# Patient Record
Sex: FEMALE | Race: WHITE | NOT HISPANIC OR LATINO | Employment: UNEMPLOYED | URBAN - METROPOLITAN AREA
[De-identification: names, ages, dates, MRNs, and addresses within clinical notes are randomized per-mention and may not be internally consistent; named-entity substitution may affect disease eponyms.]

---

## 2021-09-03 ENCOUNTER — TRANSCRIBE ORDERS (OUTPATIENT)
Dept: PHYSICAL THERAPY | Facility: CLINIC | Age: 36
End: 2021-09-03

## 2021-09-03 DIAGNOSIS — S46.819A STRAIN OF TRAPEZIUS MUSCLE, UNSPECIFIED LATERALITY, INITIAL ENCOUNTER: ICD-10-CM

## 2021-09-03 DIAGNOSIS — S46.811A TRAPEZIUS STRAIN, RIGHT, INITIAL ENCOUNTER: Primary | ICD-10-CM

## 2021-09-07 ENCOUNTER — TREATMENT (OUTPATIENT)
Dept: PHYSICAL THERAPY | Facility: CLINIC | Age: 36
End: 2021-09-07

## 2021-09-07 DIAGNOSIS — M54.50 THORACOLUMBAR BACK PAIN: ICD-10-CM

## 2021-09-07 DIAGNOSIS — S46.811D TRAPEZIUS STRAIN, RIGHT, SUBSEQUENT ENCOUNTER: Primary | ICD-10-CM

## 2021-09-07 DIAGNOSIS — M54.6 THORACOLUMBAR BACK PAIN: ICD-10-CM

## 2021-09-07 PROCEDURE — 97161 PT EVAL LOW COMPLEX 20 MIN: CPT | Performed by: PHYSICAL THERAPIST

## 2021-09-07 PROCEDURE — 97014 ELECTRIC STIMULATION THERAPY: CPT | Performed by: PHYSICAL THERAPIST

## 2021-09-07 PROCEDURE — 97140 MANUAL THERAPY 1/> REGIONS: CPT | Performed by: PHYSICAL THERAPIST

## 2021-09-07 NOTE — PROGRESS NOTES
"Physical Therapy Initial Evaluation and Plan of Care    Patient: Poornima Cano   : 1985  Diagnosis/ICD-10 Code:  Trapezius strain, right, subsequent encounter [S46.811D]  Referring practitioner: LAWRENCE Aguilar  Date of Initial Visit: 2021  Today's Date: 2021  Patient seen for 1 sessions           Subjective Questionnaire: Quick DASH   36/55      Subjective Evaluation    History of Present Illness  Mechanism of injury: Patient reports that she was at working, lifting a second 150# metal bar (with assist from another person) she felt a pop at approx waist level. Immediate pain into the right hip. Date of onset:   21.  She did continue to work her shift.  The pain radiated up the back.  When she went to work the next day, the pain was worse; to ; sent home that day.   Off work for the weekend, when she returned to work on Monday, the pain was about the same.  She was sent to [facility]; placed on naproxen (stopped due to stomach; thus stopped) and place on light duty for work.   Now driving a forklift.  The gear shifts do hurt.  The current pain is some better than onset day.   However, any day I do activity, \"I feel it is a set back\"  No change with cough/sneeze.  No associated Has, visual or auditory issues.   CHIEF CO:  All the above - pain, wakes her up at night, pain at bra level  LOCATION  R shldr pain is from mid neck to arm pit and mid back  DESCRIPTION  R UT pain is a toothache,  R anterior shldr and bicep area feels like it is on fire at times.  Random spasm in the shldr.   INCREASES  Picking up \"anything\" from a full 16 oz Yeti cup, driving due to the need to extend the arm hurts.  Tries to due more things with the left hand/arm due to \"protecting\" the arm.  Latching the bra.   DECREASES   Rest,  Ice   NO CHANGE WITH HEAT.  TIME OF DAY:  Work day the pain is worse by evening    SLEEP  Normal is RSB, normal is 6-8 hrs.  Now she has difficulty getting comfortable,  Trying LSB with " a pillow,  Waking up q 2-3 hrs.  Does not prefer supine.    PRIOR EX:  Normal is running in the warm months.   PAST MEDICAL HISTORY:  Afebrile seizures/last one was around 4 yrs of age.     (-) pacemaker, CA, DM metal implants,    (+) loratab,  Intolerance to bendryl (sleeps really long), phenobarbital        Patient Occupation: [employer]ing  2pm - 1030 p   Precautions and Work Restrictions: current is light dutyQuality of life: excellent    Pain  Current pain ratin  At best pain ratin  At worst pain rating: 10    Hand dominance: right             Objective          Postural Observations    Additional Postural Observation Details  AC to ear: L 17 cml   R 18 cm 5 FHP     Palpation     Right Tenderness of the levator scapulae and upper trapezius.   Trigger point to upper trapezius.     Tenderness     Right Shoulder  Tenderness in the subscapularis tendon and supraspinatus tendon.     Cervical/Thoracic Screen   Cervical range of motion within normal limits  Cervical range of motion within normal limits with the following exceptions: LSB with stretch feeling on the R    Active Range of Motion   Left Shoulder   External rotation BTH: T3   Internal rotation BTB: T7     Right Shoulder   Flexion: 140 degrees   Extension: 55 degrees   Abduction: 135 degrees   External rotation 0°: 75 degrees   External rotation BTH: T3   Internal rotation BTB: T9     Additional Active Range of Motion Details  Flex:   Pain from 95 to end range   abd pain from 10 to end range    Passive Range of Motion     Right Shoulder   Normal passive range of motion    Additional Passive Range of Motion Details  Slight pain with PROM R shldr @ 120 flex, abd    Scapular Mobility     Right Shoulder   Scapular mobility: WFL    Joint Play     Additional Joint Play Details  jt mobility shldr normal   Cervical PIVMs  1+ R upglides  Thoracic PA  2-/3    Strength/Myotome Testing     Additional Strength Details  : L 80#,  R 70#    Pinch:   L 15#,  12.5#    Tip pinch:   L 10#,   R 7#   R shldr flex, abd 3+ with increased pain.  ER 4-,  IR 4-, supraspinatus 3+ greater pain, subscap 4-, T minor 4-  Elbow, forearm wrist, thumb extension and lumbricals 5/5    Tests     Right Shoulder   Positive Neer's and painful arc.   Negative anterior load and shift, apprehension, belly press, drop arm, laxity (step off) and lift-off.           Assessment & Plan     Assessment  Impairments: abnormal muscle firing, abnormal or restricted ROM, activity intolerance, impaired physical strength, lacks appropriate home exercise program and pain with function  Assessment details: Poornima Cano is a 35 y.o. yr old female referred to PT due to  R shoulder/neck/UT pain due to an injury sustained at work when lifting a steel beam on 21.  She does report the R shldr pain is the most significant; however does have some mid back pain.    The initial PT evaluation was completed today with evidence consist of R UT strain with associated reduction of cervical passive motion, R Shldr painful arc/impingement and weakness. .  Skilled PT intervention is indicated in order to achieve the stated goals and achieve maximal functional capacity.  The Quick DASH score is 36/55    eval complexity:  Low  Prognosis: good  Functional Limitations: carrying objects, lifting, sleeping, pulling, pushing, uncomfortable because of pain, moving in bed, reaching behind back and reaching overhead  Goals  Plan Goals: Patient goals:   1)  ROM R shldr and 2) move the arm normally  ST visits     1)  Reduce pain level by 2-3 points     2)  Increase AROM R shldr to =/> 80% norm values wo greater pain     3)  Sleep with < 25% interruption due to shldr pain      4)  Pnt to report less arm pain in the 1st am by 30%    LTG:   DC     1)  0-2/10 pain level in order to complete household and work tasks wo greater pain     2)  MMT =/> 4+/5 with improved ability to lift objects for household, yard and daily living tasks wo  greater pain     3)  Improve the functional survey =/> 8 points     4)  Normal sleep pattern in relationship to the shldr injury     5)  Resume prior normal work duties     6)  IHEP     Plan  Therapy options: will be seen for skilled physical therapy services  Planned modality interventions: cryotherapy, dry needling, electrical stimulation/Russian stimulation, high voltage pulsed current (pain management), iontophoresis, TENS, thermotherapy (hydrocollator packs) and ultrasound  Planned therapy interventions: manual therapy, neuromuscular re-education, motor coordination training, postural training, therapeutic activities, stretching, strengthening, soft tissue mobilization, joint mobilization, home exercise program, functional ROM exercises, flexibility, body mechanics training, abdominal trunk stabilization and spinal/joint mobilization  Frequency: 3x week  Treatment plan discussed with: patient        Timed:         Manual Therapy:    10     mins  24538;     Therapeutic Exercise:    3     mins  95581;     Neuromuscular Ivan:        mins  86170;    Therapeutic Activity:          mins  06145;     Gait Training:           mins  15271;     Ultrasound:          mins  95282;    Ionto                                   mins   65789  Self Care                       3     mins   56999  Canalith Repos         mins 14857      Un-Timed:  Electrical Stimulation:    15     mins  34162 ( );  Dry Needling          mins self-pay  Traction          mins 75665  Low Eval     24     Mins  79609  Mod Eval          Mins  45512  High Eval                            Mins  41836  Re-Eval                               mins  62013        Timed Treatment:   16   mins   Total Treatment:     55   mins    PT SIGNATURE: Priya Olivares, ELLIOT   DATE TREATMENT INITIATED: 9/7/2021    Initial Certification  Certification Period: 12/6/2021  I certify that the therapy services are furnished while this patient is under my care.  The services  outlined above are required by this patient, and will be reviewed every 90 days.     PHYSICIAN: Pedro Flores PA      DATE:     Please sign and return via fax to [phone].. Thank you, [facility].

## 2021-09-09 ENCOUNTER — TREATMENT (OUTPATIENT)
Dept: PHYSICAL THERAPY | Facility: CLINIC | Age: 36
End: 2021-09-09

## 2021-09-09 ENCOUNTER — TELEPHONE (OUTPATIENT)
Dept: PHYSICAL THERAPY | Facility: CLINIC | Age: 36
End: 2021-09-09

## 2021-09-09 DIAGNOSIS — S46.811D TRAPEZIUS STRAIN, RIGHT, SUBSEQUENT ENCOUNTER: Primary | ICD-10-CM

## 2021-09-09 PROCEDURE — 97140 MANUAL THERAPY 1/> REGIONS: CPT | Performed by: PHYSICAL THERAPIST

## 2021-09-09 PROCEDURE — 97014 ELECTRIC STIMULATION THERAPY: CPT | Performed by: PHYSICAL THERAPIST

## 2021-09-09 PROCEDURE — 97035 APP MDLTY 1+ULTRASOUND EA 15: CPT | Performed by: PHYSICAL THERAPIST

## 2021-09-09 NOTE — PROGRESS NOTES
Physical Therapy Daily Progress Note    VISIT#: 2    Subjective   Poornima Cano reports she had to trim the Ktape as some of it was coming off. Pt with 8 pain today.     Objective     Palpation: Trigger point noted R UT/LS     See Exercise, Manual, and Modality Logs for complete treatment.     Patient Education: cues for therex    Assessment/Plan  Pt extremely tender with attempts at manual therapy so added US followed by remainder of     Progress per Plan of Care and Progress strengthening /stabilization /functional activity            Timed:         Manual Therapy:   18      mins  51840;     Therapeutic Exercise:    2     mins  70801;     Neuromuscular Ivan:        mins  12215;    Therapeutic Activity:          mins  60762;     Gait Training:           mins  28764;     Ultrasound:   12      mins  59437;    Ionto                                   mins   65035  Self Care                            mins   25614  Canalith Repos                   mins  34699    Un-Timed:  Electrical Stimulation:   15      mins  19560 ( );  Dry Needling          mins self-pay  Traction          mins 98782  Low Eval          Mins  33921  Mod Eval          Mins  30882  High Eval                           Mins  33885  Re-Eval                               mins  96266    Timed Treatment:  32    mins   Total Treatment:     47   mins    Angela Pruitt, PT  [place] License # [id number]  Physical Therapist

## 2021-09-13 ENCOUNTER — TREATMENT (OUTPATIENT)
Dept: PHYSICAL THERAPY | Facility: CLINIC | Age: 36
End: 2021-09-13

## 2021-09-13 DIAGNOSIS — M54.6 THORACOLUMBAR BACK PAIN: ICD-10-CM

## 2021-09-13 DIAGNOSIS — M54.50 THORACOLUMBAR BACK PAIN: ICD-10-CM

## 2021-09-13 DIAGNOSIS — S46.811D TRAPEZIUS STRAIN, RIGHT, SUBSEQUENT ENCOUNTER: Primary | ICD-10-CM

## 2021-09-13 PROCEDURE — 97014 ELECTRIC STIMULATION THERAPY: CPT | Performed by: PHYSICAL THERAPIST

## 2021-09-13 PROCEDURE — 97110 THERAPEUTIC EXERCISES: CPT | Performed by: PHYSICAL THERAPIST

## 2021-09-13 PROCEDURE — 97140 MANUAL THERAPY 1/> REGIONS: CPT | Performed by: PHYSICAL THERAPIST

## 2021-09-13 NOTE — PROGRESS NOTES
Physical Therapy Daily Progress Note    VISIT#: 3    Subjective   Poornima Cano reports that she feels that ultrasound didn't do anything and that her skin is having an adverse reaction to the KT tape, but ovreall her pain is decreasing  Pain Rating (0/10): 0    Objective     See Exercise, Manual, and Modality Logs for complete treatment.       Assessment/Plan   Ultrasound and KT taping held at this time due to pt reporting no improvements and adverse reaction with slight skin irritation noted where KT tape was previously applied. STM to UT and LS performed today with improvements in hypertonicity noted following.     Plan  Progress per Plan of Care and Progress strengthening /stabilization /functional activity            Timed:         Manual Therapy:    18     mins  47500;     Therapeutic Exercise:    13     mins  07766;     Neuromuscular Ivan:        mins  70968;    Therapeutic Activity:          mins  83555;     Gait Training:           mins  34149;     Ultrasound:          mins  16432;    Ionto                                   mins   89343  Self Care                            mins   27468    Un-Timed:  Electrical Stimulation:    15     mins  06831 ( );  Dry Needling          mins self-pay  Traction          mins 06643  Low Eval          Mins  61185  Mod Eval          Mins  55611  High Eval                            Mins  64165  Re-Eval                               mins  27908    Timed Treatment:   31   mins   Total Treatment:     46   mins    Hina Infante PT, DPT  Physical Therapist

## 2021-09-15 ENCOUNTER — TREATMENT (OUTPATIENT)
Dept: PHYSICAL THERAPY | Facility: CLINIC | Age: 36
End: 2021-09-15

## 2021-09-15 DIAGNOSIS — M54.50 THORACOLUMBAR BACK PAIN: ICD-10-CM

## 2021-09-15 DIAGNOSIS — S46.811D TRAPEZIUS STRAIN, RIGHT, SUBSEQUENT ENCOUNTER: Primary | ICD-10-CM

## 2021-09-15 DIAGNOSIS — M54.6 THORACOLUMBAR BACK PAIN: ICD-10-CM

## 2021-09-15 PROCEDURE — 97140 MANUAL THERAPY 1/> REGIONS: CPT | Performed by: PHYSICAL THERAPIST

## 2021-09-15 PROCEDURE — 97014 ELECTRIC STIMULATION THERAPY: CPT | Performed by: PHYSICAL THERAPIST

## 2021-09-15 PROCEDURE — 97110 THERAPEUTIC EXERCISES: CPT | Performed by: PHYSICAL THERAPIST

## 2021-09-15 NOTE — PROGRESS NOTES
Physical Therapy Daily Progress Note    VISIT#: 4    Subjective   Poornima Cano reports that the pain is less; but with more soreness.  Feels the levator stretch is the most beneficial of all the stretches.       Objective     See Exercise, Manual, and Modality Logs for complete treatment.     Patient Education:    Assessment/Plan  Poornima was able to complete shldr band and bar shlsr ex with some fatigue and very mild posterior R shldr pain during extension.       Plan;  Cont with the current treatment plan.             Timed:         Manual Therapy:    15     mins  26253;     Therapeutic Exercise:    12     mins  66130;     Neuromuscular Ivan:        mins  85128;    Therapeutic Activity:          mins  15002;     Gait Training:           mins  01908;     Ultrasound:          mins  25644;    Ionto                                   mins   54671  Self Care                            mins   26477  Canalith Repos                   mins  36327    Un-Timed:  Electrical Stimulation:    15     mins  71828 ( );  Dry Needling          mins self-pay  Traction          mins 36872  Low Eval          Mins  25723  Mod Eval          Mins  62812  High Eval                            Mins  33505  Re-Eval                               mins  16120    Timed Treatment:   27   mins   Total Treatment:     42   mins    Priya Olivares PT    Physical Therapist

## 2021-09-17 ENCOUNTER — TREATMENT (OUTPATIENT)
Dept: PHYSICAL THERAPY | Facility: CLINIC | Age: 36
End: 2021-09-17

## 2021-09-17 DIAGNOSIS — M54.50 THORACOLUMBAR BACK PAIN: ICD-10-CM

## 2021-09-17 DIAGNOSIS — S46.811D TRAPEZIUS STRAIN, RIGHT, SUBSEQUENT ENCOUNTER: Primary | ICD-10-CM

## 2021-09-17 DIAGNOSIS — M54.6 THORACOLUMBAR BACK PAIN: ICD-10-CM

## 2021-09-17 PROCEDURE — 97110 THERAPEUTIC EXERCISES: CPT | Performed by: PHYSICAL THERAPIST

## 2021-09-17 PROCEDURE — 97014 ELECTRIC STIMULATION THERAPY: CPT | Performed by: PHYSICAL THERAPIST

## 2021-09-17 PROCEDURE — 97140 MANUAL THERAPY 1/> REGIONS: CPT | Performed by: PHYSICAL THERAPIST

## 2021-09-17 PROCEDURE — 97530 THERAPEUTIC ACTIVITIES: CPT | Performed by: PHYSICAL THERAPIST

## 2021-09-17 NOTE — PROGRESS NOTES
Physical Therapy Daily Progress Note    VISIT#: 5    Subjective   Poornima Cano reports that she did feel more of the upper back sensation after the last session.  States the pain started about 6-8 hrs after the session and lasted the rest of the day.        Objective   Palpation with moderate R levator and UT thickening.  Normal Rhomboid   See Exercise, Manual, and Modality Logs for complete treatment.     Patient Education: discussion of importance of resuming strengthening in a slow controlled fashion.     Assessment/Plan  Patient did report greater soreness in the R mid back approx 6 -8 hrs post the last session.  However, the only ex added were shldr band saws, ext and chest press.  These ex would not cause such report and at such time lapse.  She did complete additional prone strengthening today wo any change of pain reports.     Plan:  Cont with PT as tolerance.          Timed:         Manual Therapy:    15     mins  48157;     Therapeutic Exercise:    12     mins  58124;     Neuromuscular Ivan:        mins  04506;    Therapeutic Activity:       10   mins  65245;     Gait Training:           mins  37155;     Ultrasound:          mins  21744;    Ionto                                   mins   93554  Self Care                       3     mins   21375  Canalith Repos                   mins  25166    Un-Timed:  Electrical Stimulation:         mins  65303 ( );  Dry Needling          mins self-pay  Traction          mins 52365  Low Eval          Mins  15046  Mod Eval          Mins  57114  High Eval                            Mins  31744  Re-Eval                               mins  27637    Timed Treatment:   40   mins   Total Treatment:     44   mins    Priya Olivares PT    Physical Therapist

## 2021-09-20 ENCOUNTER — TREATMENT (OUTPATIENT)
Dept: PHYSICAL THERAPY | Facility: CLINIC | Age: 36
End: 2021-09-20

## 2021-09-20 DIAGNOSIS — M54.6 THORACOLUMBAR BACK PAIN: ICD-10-CM

## 2021-09-20 DIAGNOSIS — S46.811D TRAPEZIUS STRAIN, RIGHT, SUBSEQUENT ENCOUNTER: Primary | ICD-10-CM

## 2021-09-20 DIAGNOSIS — M54.50 THORACOLUMBAR BACK PAIN: ICD-10-CM

## 2021-09-20 PROCEDURE — 97140 MANUAL THERAPY 1/> REGIONS: CPT | Performed by: PHYSICAL THERAPIST

## 2021-09-20 PROCEDURE — 97110 THERAPEUTIC EXERCISES: CPT | Performed by: PHYSICAL THERAPIST

## 2021-09-20 NOTE — PROGRESS NOTES
Re-Assessment / Progress Note    Patient: Poornima Cano   : 1985  Diagnosis/ICD-10 Code:  Trapezius strain, right, subsequent encounter [S46.811D]  Referring practitioner: No ref. provider found  Date of Initial Visit: Episode Type: THERAPY  Noted: 2021    Today's Date: 2021  Patient seen for 6 sessions.      Subjective:   Poornima Cano reports: that her pain is down overall and she is continuing to get numbness and tingling intermittently. She also reports that stabbing pain is infrequent and lasts short duration of time. Pain current:  3/10  Best: 3/10   Worst: 7/10  Subjective Questionnaire: QuickDASH: 31% disability  Clinical Progress: improved  Home Program Compliance: Yes  Treatment has included: therapeutic exercise, neuromuscular re-education, manual therapy, therapeutic activity, electrical stimulation, moist heat and cryotherapy    Subjective   Objective   Assessment/Plan     Objective          Palpation     Right Tenderness of the levator scapulae and upper trapezius.   Trigger point to upper trapezius.     Tenderness     Right Shoulder  Tenderness in the subscapularis tendon and supraspinatus tendon.       Active Range of Motion   Left Shoulder   External rotation BTH: T3   Internal rotation BTB: T7     Right Shoulder   Flexion: 130 degrees   Extension: 55 degrees   Abduction: 113 degrees   External rotation 0°: 83 degrees   External rotation BTH: T3   Internal rotation BTB: T9       Scapular Mobility     Right Shoulder   Scapular mobility: WFL      Strength/Myotome Testing     Additional Strength Details  : L 68#,  R 46#    R shldr flex, abd 4 with increased pain.  ER 4+,  IR 4+, supraspinatus 4 greater pain, subscap 4, T minor 4  Elbow, forearm wrist, thumb extension and lumbricals 5/5    Tests     Right Shoulder   Positive Neer's and painful arc.   Negative anterior load and shift, apprehension, belly press, drop arm, laxity (step off) and lift-off.           Assessment & Plan      Assessment  Impairments: abnormal muscle firing, abnormal or restricted ROM, activity intolerance, impaired physical strength, lacks appropriate home exercise program and pain with function  Assessment details: Poornima Cano is a 35 y.o. yr old female referred to PT due to  R shoulder/neck/UT pain due to an injury sustained at work when lifting a steel beam on 21.  She does report the R shldr pain is the most significant; however does have some mid back pain.    The initial PT evaluation was completed with evidence consist of R UT strain with associated reduction of cervical passive motion, R Shldr painful arc/impingement and weakness.Pt has demonstrated improvements in strength, activity tolerance, decreased pain and improved quick DASH score. Pt would benefit from continued skilled PT to address the remaining impairments.  The Quick DASH score is 31/55    eval complexity:  Low  Prognosis: good  Functional Limitations: carrying objects, lifting, sleeping, pulling, pushing, uncomfortable because of pain, moving in bed, reaching behind back and reaching overhead  Goals  Plan Goals: Patient goals:   1)  ROM R shldr and 2) move the arm normally  ST visits     1)  Reduce pain level by 2-3 points- MET     2)  Increase AROM R shldr to =/> 80% norm values wo greater pain- Progressing     3)  Sleep with < 25% interruption due to shldr pain - MET     4)  Pnt to report less arm pain in the 1st am by 30%- MET    LTG:   DC     1)  0-2/10 pain level in order to complete household and work tasks wo greater pain- progressing     2)  MMT =/> 4+/5 with improved ability to lift objects for household, yard and daily living tasks wo greater pain-progressing     3)  Improve the functional survey =/> 8 points- progressing     4)  Normal sleep pattern in relationship to the shldr injury- MET     5)  Resume prior normal work duties- progressing     6)  IHEP -progressing      Progress toward previous goals: Partially  Met  Recommendations: Continue as planned  Timeframe: 1 month  Prognosis to achieve goals: good    PT Signature: Hina Infante PT, DPT  [place] Lic. # [id number]        Based upon review of the patient's progress and continued therapy plan, it is my medical opinion that Poornima Cano should continue physical therapy treatment at [facility]  [address]  [place] [address] [phone].    Signature: __________________________________      Timed:         Manual Therapy:    15     mins  03783;     Therapeutic Exercise:    15     mins  14860;     Neuromuscular Ivan:        mins  72636;    Therapeutic Activity:          mins  01681;     Gait Training:           mins  21991;     Ultrasound:          mins  03775;    Ionto                                   mins   03935  Self Care                            mins   08719        Un-Timed:  Electrical Stimulation:         mins  33824 ( );  Dry Needling          mins self-pay  Traction          mins 91961  Low Eval          Mins  78058  Mod Eval          Mins  02738  High Eval                           Mins  27297  Re-Eval                               mins  66293        Timed Treatment:   30   mins   Total Treatment:     30   mins

## 2021-09-27 ENCOUNTER — TREATMENT (OUTPATIENT)
Dept: PHYSICAL THERAPY | Facility: CLINIC | Age: 36
End: 2021-09-27

## 2021-09-27 DIAGNOSIS — M54.50 THORACOLUMBAR BACK PAIN: ICD-10-CM

## 2021-09-27 DIAGNOSIS — S46.811D TRAPEZIUS STRAIN, RIGHT, SUBSEQUENT ENCOUNTER: Primary | ICD-10-CM

## 2021-09-27 DIAGNOSIS — M54.6 THORACOLUMBAR BACK PAIN: ICD-10-CM

## 2021-09-27 PROCEDURE — 97140 MANUAL THERAPY 1/> REGIONS: CPT | Performed by: PHYSICAL THERAPIST

## 2021-09-27 PROCEDURE — 97110 THERAPEUTIC EXERCISES: CPT | Performed by: PHYSICAL THERAPIST

## 2021-09-27 NOTE — PROGRESS NOTES
Physical Therapy Daily Progress Note    VISIT#: 7    Subjective   Poornima Cano reports that she had noted increase in pain following prolonged period of time between sessions with greatest pain noted yesterday.   Pain Rating (0/10): 6    Objective     See Exercise, Manual, and Modality Logs for complete treatment.     Assessment/Plan   Hypertonicity noted in R UT today with minimal relaxation noted following manual therapy with MHP applied at the end of the session for muscle relaxation and slight improvement noted    Plan  Progress per Plan of Care and Progress strengthening /stabilization /functional activity            Timed:         Manual Therapy:    15     mins  52748;     Therapeutic Exercise:    12     mins  90760;     Neuromuscular Ivan:        mins  17439;    Therapeutic Activity:          mins  70236;     Gait Training:           mins  72832;     Ultrasound:          mins  37899;    Ionto                                   mins   25191  Self Care                            mins   90788    Un-Timed:  Electrical Stimulation:         mins  29429 ( );  Dry Needling          mins self-pay  Traction          mins 00530  Low Eval          Mins  45893  Mod Eval          Mins  09757  High Eval                            Mins  63463  Re-Eval                               mins  64685    Timed Treatment:   27   mins   Total Treatment:     27   mins    Hina Infante PT, DPT  Physical Therapist

## 2021-09-29 ENCOUNTER — TREATMENT (OUTPATIENT)
Dept: PHYSICAL THERAPY | Facility: CLINIC | Age: 36
End: 2021-09-29

## 2021-09-29 DIAGNOSIS — S46.811D TRAPEZIUS STRAIN, RIGHT, SUBSEQUENT ENCOUNTER: Primary | ICD-10-CM

## 2021-09-29 DIAGNOSIS — M54.6 THORACOLUMBAR BACK PAIN: ICD-10-CM

## 2021-09-29 DIAGNOSIS — M54.50 THORACOLUMBAR BACK PAIN: ICD-10-CM

## 2021-09-29 PROCEDURE — 97140 MANUAL THERAPY 1/> REGIONS: CPT | Performed by: PHYSICAL THERAPIST

## 2021-09-29 PROCEDURE — 97110 THERAPEUTIC EXERCISES: CPT | Performed by: PHYSICAL THERAPIST

## 2021-09-29 NOTE — PROGRESS NOTES
Physical Therapy Daily Progress Note    VISIT#: 8    Subjective   Poornima Cano reports that she was woken up again last night due to pain and that she is continuing to have burning going down the R side of her spine in mid thoracic   Pain Rating (0/10): 8 at the beginning of the session 4 following treatment    Objective     See Exercise, Manual, and Modality Logs for complete treatment.       Assessment/Plan   MHP was applied pre tx for increased muscle extensibility, with greater tolerance and decreased hypertonicity of R UT and scapular musculature. IASTM performed today with noted improvements in tone of RUT and in decreased burning/numbness and tingling of RUE    Plan  Progress per Plan of Care and Progress strengthening /stabilization /functional activity            Timed:         Manual Therapy:    20     mins  91474;     Therapeutic Exercise:    10     mins  06055;     Neuromuscular Ivan:       mins  84866;    Therapeutic Activity:          mins  82908;     Gait Training:           mins  19528;     Ultrasound:          mins  91814;    Ionto                                   mins   06448  Self Care                            mins   69366    Un-Timed:  Electrical Stimulation:        mins  69241 ( );  Dry Needling          mins self-pay  Traction          mins 61739  Low Eval          Mins  52560  Mod Eval          Mins  88149  High Eval                            Mins  07178  Re-Eval                               mins  63016    Timed Treatment:   30   mins   Total Treatment:     30   mins    Hina Infante PT, DPT  Physical Therapist

## 2021-10-01 ENCOUNTER — TREATMENT (OUTPATIENT)
Dept: PHYSICAL THERAPY | Facility: CLINIC | Age: 36
End: 2021-10-01

## 2021-10-01 DIAGNOSIS — M54.6 THORACOLUMBAR BACK PAIN: ICD-10-CM

## 2021-10-01 DIAGNOSIS — S46.811D TRAPEZIUS STRAIN, RIGHT, SUBSEQUENT ENCOUNTER: Primary | ICD-10-CM

## 2021-10-01 DIAGNOSIS — M54.50 THORACOLUMBAR BACK PAIN: ICD-10-CM

## 2021-10-01 PROCEDURE — 97014 ELECTRIC STIMULATION THERAPY: CPT | Performed by: PHYSICAL THERAPIST

## 2021-10-01 PROCEDURE — 97140 MANUAL THERAPY 1/> REGIONS: CPT | Performed by: PHYSICAL THERAPIST

## 2021-10-01 PROCEDURE — 97110 THERAPEUTIC EXERCISES: CPT | Performed by: PHYSICAL THERAPIST

## 2021-10-01 NOTE — PROGRESS NOTES
Physical Therapy Daily Progress Note    VISIT#: 9    Subjective   Poornima Cano reports the pain is currently 2-3/10    Did get some relief and ability to sleep       Objective   Treatment session started with MHP/IFC.  Palpation with mild thickening R rhomboid and RUT with restricted cervical PIVMs.   See Exercise, Manual, and Modality Logs for complete treatment.     Patient Education:    Assessment/Plan  Poornima presented today with less pain than the last session.  Able to complete the ex wo greater pain.     Plan:  If pain level remains low, less modalities and increase ex/functional task as pnt is on altered dutes.            Timed:         Manual Therapy:    16     mins  68851;     Therapeutic Exercise:    13     mins  91055;     Neuromuscular Ivan:        mins  09098;    Therapeutic Activity:          mins  14899;     Gait Training:           mins  43049;     Ultrasound:          mins  07125;    Ionto                                   mins   70606  Self Care                            mins   36897  Canalith Repos                   mins  16355    Un-Timed:  Electrical Stimulation:    15     mins  40226 ( );  Dry Needling          mins self-pay  Traction          mins 10429  Low Eval          Mins  15877  Mod Eval          Mins  87980  High Eval                            Mins  38231  Re-Eval                               mins  56868    Timed Treatment:   29   mins   Total Treatment:     44   mins    Priya Olivares PT    Physical Therapist

## 2021-10-04 ENCOUNTER — TREATMENT (OUTPATIENT)
Dept: PHYSICAL THERAPY | Facility: CLINIC | Age: 36
End: 2021-10-04

## 2021-10-04 DIAGNOSIS — M54.50 THORACOLUMBAR BACK PAIN: ICD-10-CM

## 2021-10-04 DIAGNOSIS — S46.811D TRAPEZIUS STRAIN, RIGHT, SUBSEQUENT ENCOUNTER: Primary | ICD-10-CM

## 2021-10-04 DIAGNOSIS — M54.6 THORACOLUMBAR BACK PAIN: ICD-10-CM

## 2021-10-04 PROCEDURE — 97014 ELECTRIC STIMULATION THERAPY: CPT | Performed by: PHYSICAL THERAPIST

## 2021-10-04 PROCEDURE — 97140 MANUAL THERAPY 1/> REGIONS: CPT | Performed by: PHYSICAL THERAPIST

## 2021-10-04 PROCEDURE — 97110 THERAPEUTIC EXERCISES: CPT | Performed by: PHYSICAL THERAPIST

## 2021-10-04 NOTE — PROGRESS NOTES
Physical Therapy Daily Progress Note    Patient: Poornima Cano   : 1985  Diagnosis/ICD-10 Code:  Trapezius strain, right, subsequent encounter [S46.811D]  Referring practitioner: LAWRENCE Aguilar  Date of Initial Visit: Type: THERAPY  Noted: 2021  Today's Date: 10/4/2021  Patient seen for 10 sessions           Subjective Evaluation    History of Present Illness    Subjective comment: strain R UT continues. consents to thoracic adjustment today.        Objective   See Exercise, Manual, and Modality Logs for complete treatment.       Assessment & Plan     Assessment  Assessment details: Did well today with manip to mid thoracic spine.   Added UBE with no increase in pain. Added DNF ex for weak ant cervicals.  P: cont with combo of manual, modalities, ex rehab                 Timed:    Manual Therapy:    15     mins  83375;  Therapeutic Exercise:    15     mins  73252;     Neuromuscular Ivan:        mins  48852;    Therapeutic Activity:          mins  09835;     Gait Training:           mins  08084;     Ultrasound:          mins  30217;    Electrical Stimulation:         mins  49315 ( );  Iontophoresis         mins 09659;  Aquatic Therapy         mins 76208;  Dry Needling                   mins    Untimed:  Electrical Stimulation:    15     mins  90810 (MC );  Mechanical Traction:         mins  68429;     Timed Treatment:   30   mins   Total Treatment:     55   mins  Zorre Zeno Kimura, PT  Physical Therapist

## 2021-10-06 ENCOUNTER — TREATMENT (OUTPATIENT)
Dept: PHYSICAL THERAPY | Facility: CLINIC | Age: 36
End: 2021-10-06

## 2021-10-06 DIAGNOSIS — M54.6 THORACOLUMBAR BACK PAIN: ICD-10-CM

## 2021-10-06 DIAGNOSIS — M54.50 THORACOLUMBAR BACK PAIN: ICD-10-CM

## 2021-10-06 DIAGNOSIS — S46.811D TRAPEZIUS STRAIN, RIGHT, SUBSEQUENT ENCOUNTER: Primary | ICD-10-CM

## 2021-10-06 PROCEDURE — 97110 THERAPEUTIC EXERCISES: CPT | Performed by: PHYSICAL THERAPIST

## 2021-10-06 NOTE — PROGRESS NOTES
"Physical Therapy Daily Progress Note/30 day re-assess    Patient: Poornima Cano   : 1985  Diagnosis/ICD-10 Code:  Trapezius strain, right, subsequent encounter [S46.811D]  Referring practitioner: LAWRENCE Aguilar  Date of Initial Visit: Type: THERAPY  Noted: 2021  Today's Date: 10/6/2021  Patient seen for 11 sessions           Subjective Evaluation    History of Present Illness    Subjective comment: did well after last rx and manip. RTO OM this Friday after PT. 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 sleeping better.  is okay but twisting motion to open a tight jar was very difficult last night       Objective   See Exercise, Manual, and Modality Logs for complete treatment.       Assessment & Plan     Assessment  Assessment details: Goals  Plan Goals: Patient goals:   1)  ROM R shldr and 2) move the arm normally  ST visits     1)  Reduce pain level by 2-3 points. MET was 5-10 now 3/10     2)  Increase AROM R shldr to =/> 80% norm values wo greater pain. MET. 95% with small arc of pain but can work thru. C/o fatigue.      3)  Sleep with < 25% interruption due to shldr pain. MET. Able to sleep thru the night now.      4)  Pnt to report less arm pain in the 1st am by 30%. MET.    LTG:   DC     1)  0-2/10 pain level in order to complete household and work tasks wo greater pain. Partially met     2)  MMT =/> 4+/5 with improved ability to lift objects for household, yard and daily living tasks wo greater pain.Partially met. 4- lenin weak UT and posterior sh/mid trap     3)  Improve the functional survey =/> 8 points. NOT MET. 31 to just 29.5%     4)  Normal sleep pattern in relationship to the shldr injury. MET     5)  Resume prior normal work duties. Ongoing. RTO 10-8-21 to discuss     6)  IHEP. Ongoing as she progresses to more vigorous activity    Pt see for her 11th session today. Feels stronger and sleeping better but " pain and weakness of neck and posterior shoulder continues. Objective testing shows limited neck SB left and 4- strength of R scap and shoulder muscles vs. 5/5 on the L so symmetry and confidence are not equal yet. Excellent gains in R UE AROM vs. Eval.     New numbness with OH motion of R UE so will check first rib status next visit.   A: resolving upper trap strain.   P: OM Friday. Will most likely continue to need lifting restrictions.                Timed:    Manual Therapy:         mins  73322;  Therapeutic Exercise:    45     mins  83413;     Neuromuscular Ivan:        mins  94291;    Therapeutic Activity:          mins  23919;     Gait Training:           mins  81295;     Ultrasound:          mins  28387;    Electrical Stimulation:         mins  39384 ( );  Iontophoresis         mins 05610;  Aquatic Therapy         mins 58261;  Dry Needling                   mins    Untimed:  Electrical Stimulation:         mins  02381 ( );  Mechanical Traction:         mins  44087;     Timed Treatment:   45   mins   Total Treatment:     45   mins  Zorre Zeno Kimura, PT  Physical Therapist

## 2021-10-08 ENCOUNTER — TREATMENT (OUTPATIENT)
Dept: PHYSICAL THERAPY | Facility: CLINIC | Age: 36
End: 2021-10-08

## 2021-10-08 DIAGNOSIS — S46.811D TRAPEZIUS STRAIN, RIGHT, SUBSEQUENT ENCOUNTER: Primary | ICD-10-CM

## 2021-10-08 DIAGNOSIS — M54.6 THORACOLUMBAR BACK PAIN: ICD-10-CM

## 2021-10-08 DIAGNOSIS — M54.50 THORACOLUMBAR BACK PAIN: ICD-10-CM

## 2021-10-08 PROCEDURE — 97014 ELECTRIC STIMULATION THERAPY: CPT | Performed by: PHYSICAL THERAPIST

## 2021-10-08 PROCEDURE — 97110 THERAPEUTIC EXERCISES: CPT | Performed by: PHYSICAL THERAPIST

## 2021-10-08 PROCEDURE — 97140 MANUAL THERAPY 1/> REGIONS: CPT | Performed by: PHYSICAL THERAPIST

## 2021-10-08 NOTE — PROGRESS NOTES
"Physical Therapy Daily Progress Note    VISIT#: 12    Subjective   Poornima Cano reports worse pain level is 2/10.  Still with random in the R upper mid back.   The current work restrictions are 10#; she does still need to adjust the lift of an 8# lift at work due to the shldr pain.  \"weakness is still there\"      Objective   R shldr ROM:   WFL with pain at end range of flex and abd ( 160),  ER and IR @ 0 abd WNLs and pain free   Palpation with mild+ thickening of rhomboid R.    See Exercise, Manual, and Modality Logs for complete treatment.     Patient Education:    Assessment/Plan   Poornima able to complete more strengthening ex today.  She does continued with soreness and pain in the soft tissue.  Most deficits include weakness as compared to her normal job duties. She would benefit from continued PT in order to further improve her strength and reduce the injury risk when she returns to full duty.    ST visits     1)  Reduce pain level by 2-3 points. MET was 5-10 now 3/10 10/6/21     2)  Increase AROM R shldr to =/> 80% norm values wo greater pain. MET. 95% with small arc of pain but can work thru. 10/6/21     3)  Sleep with < 25% interruption due to shldr pain. MET. Able to sleep thru the night now. 10/6/21     4)  Pnt to report less arm pain in the 1st am by 30%. MET. 10/6/21    LTG:   DC (data as of 10/6/21)     1)  0-2/10 pain level in order to complete household and work tasks wo greater pain. Partially met     2)  MMT =/> 4+/5 with improved ability to lift objects for household, yard and daily living tasks wo greater pain.Partially met. 4- lenin weak UT and posterior sh/mid trap     3)  Improve the functional survey =/> 8 points. NOT MET. 31 to just 29.5%     4)  Normal sleep pattern in relationship to the shldr injury. MET     5)  Resume prior normal work duties. Ongoing. RTO 10-8-21 to discuss     6)  IHEP. Ongoing as she progresses to more vigorous activity    Pt see for her 11th session today. Feels " stronger and sleeping better but pain and weakness of neck and posterior shoulder continues. Objective testing shows limited neck SB left and 4- strength of R scap and shoulder muscles vs. 5/5 on the L so symmetry and confidence are not equal yet. Excellent gains in R UE AROM vs. Eval.     Plan:  Cont with current treatment plan as per [facility];  Focus more on strengthening strengthening.            Timed:         Manual Therapy:    12     mins  50860;     Therapeutic Exercise:    18     mins  46127;     Neuromuscular Ivan:        mins  87928;    Therapeutic Activity:          mins  96208;     Gait Training:           mins  19288;     Ultrasound:          mins  81663;    Ionto                                   mins   19521  Self Care                            mins   10415  Canalith Repos                   mins  45216    Un-Timed:  Electrical Stimulation:    15     mins  50825 ( );  Dry Needling          mins self-pay  Traction          mins 53771  Low Eval          Mins  13637  Mod Eval          Mins  78374  High Eval                            Mins  79128  Re-Eval                               mins  13710    Timed Treatment:   45   mins   Total Treatment:     47   mins    Priya Olivares PT    Physical Therapist

## 2021-10-15 ENCOUNTER — TREATMENT (OUTPATIENT)
Dept: PHYSICAL THERAPY | Facility: CLINIC | Age: 36
End: 2021-10-15

## 2021-10-15 DIAGNOSIS — M54.6 THORACOLUMBAR BACK PAIN: ICD-10-CM

## 2021-10-15 DIAGNOSIS — S46.811D TRAPEZIUS STRAIN, RIGHT, SUBSEQUENT ENCOUNTER: Primary | ICD-10-CM

## 2021-10-15 DIAGNOSIS — M54.50 THORACOLUMBAR BACK PAIN: ICD-10-CM

## 2021-10-15 PROCEDURE — 97014 ELECTRIC STIMULATION THERAPY: CPT | Performed by: PHYSICAL THERAPIST

## 2021-10-15 PROCEDURE — 97530 THERAPEUTIC ACTIVITIES: CPT | Performed by: PHYSICAL THERAPIST

## 2021-10-15 PROCEDURE — 97140 MANUAL THERAPY 1/> REGIONS: CPT | Performed by: PHYSICAL THERAPIST

## 2021-10-15 PROCEDURE — 97110 THERAPEUTIC EXERCISES: CPT | Performed by: PHYSICAL THERAPIST

## 2021-10-15 NOTE — PROGRESS NOTES
Physical Therapy Daily Progress Note    VISIT#: 13    Subjective   Poornima Cano reports that she worked an 8 - hr overtime day on Sat.  States the current pain is 5/10 with location along the R UT, mid back and ariadne R shldr.   Restricted ob duties remained the same. Exception of no one available to assist during the OT day.  During our discussion, she did report the pain level, if constant, she would go to the ED. She states her uncle is a chiropractor, and she has had prior ongoing care of such, and she is wanting to go to see him.        Objective   R shldr motion:  Flex and abd with anterior and lateral shldr pain.  Shldr ext WNLs ,pain anterior shldr at end range.  BTB IR T9.   Lift off with difficulty.  Cervical ROM:  Flex with rosi cervical spine stretch,   LSB and L rotation with pain on R cervicalRUT.  Palpation:  Mild thickening of R UT.  No specific thickening at R mid trap or rhomboid; tenderness noted per patient.  No scapular winging noted.  Very mild L RS of C5C6.  Passive Motion:  shlder and  Cervical spine WNLS.     See Exercise, Manual, and Modality Logs for complete treatment.   IFC and MHP at the beginning of the session due to pnt's pain level.     Patient Education: discussion with patient regarding current presentation today.  Encouraged patient to review coverage if she does seek other professional care.    Assessment/Plan   Poornima reports more pain level today post an overtime day on Sat.  No change of the job duties.  Clinical presentation essentially unchanged from the last session regarding soft tissue, mobility and passive motion. There is noted weakness of subscap. No clinical correlation to the greater pain as described today with the exception of the subscap.   Patient does voice the desire to seek chiropractor care.     ST visits     1)  Reduce pain level by 2-3 points. MET was 5-10 now 3/10 10/6/21     2)  Increase AROM R shldr to =/> 80% norm values wo greater pain. MET. 95%  with small arc of pain but can work thru. 10/6/21     3)  Sleep with < 25% interruption due to shldr pain. MET. Able to sleep thru the night now. 10/6/21     4)  Pnt to report less arm pain in the 1st am by 30%. MET. 10/6/21    LTG:   DC (data as of 10/6/21)     1)  0-2/10 pain level in order to complete household and work tasks wo greater pain. Partially met     2)  MMT =/> 4+/5 with improved ability to lift objects for household, yard and daily living tasks wo greater pain.Partially met. 4- lenin weak UT and posterior sh/mid trap     3)  Improve the functional survey =/> 8 points. NOT MET. 31 to just 29.5%     4)  Normal sleep pattern in relationship to the shldr injury. MET     5)  Resume prior normal work duties. Ongoing. RTO 10-8-21 to discuss     6)  IHEP. Ongoing as she progresses to more vigorous activity    Plan:  Cont with current treatment             Timed:         Manual Therapy:    11     mins  20562;     Therapeutic Exercise:    10     mins  57787;     Neuromuscular Ivan:        mins  16487;    Therapeutic Activity:     11     mins  57125;     Gait Training:           mins  16560;     Ultrasound:          mins  78038;    Ionto                                   mins   66693  Self Care                       5     mins   37585  Canalith Repos                   mins  14147    Un-Timed:  Electrical Stimulation:    15     mins  76376 ( );  Dry Needling          mins self-pay  Traction          mins 29676  Low Eval          Mins  06296  Mod Eval          Mins  54369  High Eval                            Mins  22921  Re-Eval                               mins  08640    Timed Treatment:   37   mins   Total Treatment:     52   mins    Priya Olivares, PT    Physical Therapist

## 2021-10-18 ENCOUNTER — TREATMENT (OUTPATIENT)
Dept: PHYSICAL THERAPY | Facility: CLINIC | Age: 36
End: 2021-10-18

## 2021-10-18 DIAGNOSIS — M54.50 THORACOLUMBAR BACK PAIN: Primary | ICD-10-CM

## 2021-10-18 DIAGNOSIS — M54.6 THORACOLUMBAR BACK PAIN: Primary | ICD-10-CM

## 2021-10-18 DIAGNOSIS — S46.811D TRAPEZIUS STRAIN, RIGHT, SUBSEQUENT ENCOUNTER: ICD-10-CM

## 2021-10-18 PROCEDURE — 97530 THERAPEUTIC ACTIVITIES: CPT | Performed by: PHYSICAL THERAPIST

## 2021-10-18 PROCEDURE — 97110 THERAPEUTIC EXERCISES: CPT | Performed by: PHYSICAL THERAPIST

## 2021-10-18 PROCEDURE — 97014 ELECTRIC STIMULATION THERAPY: CPT | Performed by: PHYSICAL THERAPIST

## 2021-10-18 PROCEDURE — 97140 MANUAL THERAPY 1/> REGIONS: CPT | Performed by: PHYSICAL THERAPIST

## 2021-10-18 NOTE — PROGRESS NOTES
"Physical Therapy Daily Progress Note    VISIT#: 14    Subjective   Poornima Cano reports that the current pain level is a 3/10.   Stated that tings do seem better today as compared to last session.    She also states that she did \"left the clinic the other day in tears,  Couldn't turn my head in one direction\".   Used ice on the shldr most of the weekend.      Objective   Palpation:  Very mild FRS R C4C5C5,  Mild thickening L rhomboid.  Passive motion:  Cervical PIVMs - 2-/2 normal.   Thoracic  2-  See Exercise, Manual, and Modality Logs for complete treatment.     Patient Education:  Cont with stretches at home.     Assessment/Plan   Less pain reports today.  Able to complete ex with noted fatigue and some pain in the ariadne scapula during shoulder motions.    ST visits     1)  Reduce pain level by 2-3 points. MET was 5-10 now 3/10 10/6/21     2)  Increase AROM R shldr to =/> 80% norm values wo greater pain. MET. 95% with small arc of pain but can work thru. 10/6/21     3)  Sleep with < 25% interruption due to shldr pain. MET. Able to sleep thru the night now. 10/6/21     4)  Pnt to report less arm pain in the 1st am by 30%. MET. 10/6/21    LTG:   DC (data as of 10/6/21)     1)  0-2/10 pain level in order to complete household and work tasks wo greater pain. Partially met     2)  MMT =/> 4+/5 with improved ability to lift objects for household, yard and daily living tasks wo greater pain.Partially met. 4- lenin weak UT and posterior sh/mid trap     3)  Improve the functional survey =/> 8 points. NOT MET. 31 to just 29.5%     4)  Normal sleep pattern in relationship to the shldr injury. MET     5)  Resume prior normal work duties. Ongoing. RTO 10-8-21 to discuss     6)  IHEP. Ongoing as she progresses to more vigorous activity    Plan:  Cont to strengthen            Timed:         Manual Therapy:    11     mins  07715;     Therapeutic Exercise:    16     mins  40603;     Neuromuscular Ivan:        mins  69007;  "   Therapeutic Activity:     12     mins  38935;     Gait Training:           mins  95820;     Ultrasound:          mins  75445;    Ionto                                   mins   47665  Self Care                            mins   84942  Canalith Repos                   mins  41550    Un-Timed:  Electrical Stimulation:   15      mins  14731 ( );  Dry Needling          mins self-pay  Traction          mins 37756  Low Eval          Mins  52172  Mod Eval          Mins  90225  High Eval                            Mins  57088  Re-Eval                               mins  53948    Timed Treatment:   39   mins   Total Treatment:     54   mins    Priya Olivares PT    Physical Therapist

## 2021-10-20 ENCOUNTER — TREATMENT (OUTPATIENT)
Dept: PHYSICAL THERAPY | Facility: CLINIC | Age: 36
End: 2021-10-20

## 2021-10-20 DIAGNOSIS — M54.6 THORACOLUMBAR BACK PAIN: Primary | ICD-10-CM

## 2021-10-20 DIAGNOSIS — M54.50 THORACOLUMBAR BACK PAIN: Primary | ICD-10-CM

## 2021-10-20 DIAGNOSIS — S46.811D TRAPEZIUS STRAIN, RIGHT, SUBSEQUENT ENCOUNTER: ICD-10-CM

## 2021-10-20 PROCEDURE — 97140 MANUAL THERAPY 1/> REGIONS: CPT | Performed by: PHYSICAL THERAPIST

## 2021-10-20 PROCEDURE — 97110 THERAPEUTIC EXERCISES: CPT | Performed by: PHYSICAL THERAPIST

## 2021-10-20 NOTE — PROGRESS NOTES
Physical Therapy Daily Progress Note    Patient: Poornima Cano   : 1985  Diagnosis/ICD-10 Code:  Thoracolumbar back pain [M54.50, M54.6]  Referring practitioner: LAWRENCE Aguilar  Date of Initial Visit: Type: THERAPY  Noted: 2021  Today's Date: 10/20/2021  Patient seen for 15 sessions           Subjective Evaluation    History of Present Illness    Subjective comment: doing some better but everyday has to work thru stiffness and pain. still on modified duty and RTO OM next Friday. good relief with manual traction so consented to mechanical tx today       Objective   See Exercise, Manual, and Modality Logs for complete treatment.       Assessment & Plan     Assessment  Assessment details: Really liked a TENS unit she borrowed from a family member and eager to get her TENS approved. Did well with static traction but might like intermittent next time. 20/15# suggested.  Also c/o LBP ongoing since primary injury and affecting her comfort, gait and work recently. No work up by OM or us yet. Something to consider to include going forward but will need to discuss with OM.                Timed:    Manual Therapy:    15     mins  22064;  Therapeutic Exercise:    15     mins  34645;     Neuromuscular Ivan:        mins  81837;    Therapeutic Activity:          mins  58601;     Gait Training:           mins  21732;     Ultrasound:          mins  80886;    Electrical Stimulation:         mins  57943 ( );  Iontophoresis         mins 99040;  Aquatic Therapy         mins 94579;  Dry Needling                   mins    Untimed:  Electrical Stimulation:         mins  75590 ( );  Mechanical Traction:    15     mins  80041;     Timed Treatment:   45   mins   Total Treatment:     55   mins  Zorre Zeno Kimura, PT  Physical Therapist

## 2021-10-22 ENCOUNTER — TREATMENT (OUTPATIENT)
Dept: PHYSICAL THERAPY | Facility: CLINIC | Age: 36
End: 2021-10-22

## 2021-10-22 DIAGNOSIS — M54.50 THORACOLUMBAR BACK PAIN: Primary | ICD-10-CM

## 2021-10-22 DIAGNOSIS — S46.811D TRAPEZIUS STRAIN, RIGHT, SUBSEQUENT ENCOUNTER: ICD-10-CM

## 2021-10-22 DIAGNOSIS — M54.6 THORACOLUMBAR BACK PAIN: Primary | ICD-10-CM

## 2021-10-22 PROCEDURE — 97110 THERAPEUTIC EXERCISES: CPT | Performed by: PHYSICAL THERAPIST

## 2021-10-22 PROCEDURE — 97014 ELECTRIC STIMULATION THERAPY: CPT | Performed by: PHYSICAL THERAPIST

## 2021-10-22 PROCEDURE — 97530 THERAPEUTIC ACTIVITIES: CPT | Performed by: PHYSICAL THERAPIST

## 2021-10-22 PROCEDURE — 97140 MANUAL THERAPY 1/> REGIONS: CPT | Performed by: PHYSICAL THERAPIST

## 2021-10-22 NOTE — PROGRESS NOTES
Physical Therapy Daily Progress Note    VISIT#: 16    Subjective   Poornima Cano reports that she was really sore in the neck the rest of that day and the next.  Current reports of pain with patient pointing to the right T4 - T9 area.      Objective   Palpation with only very mild thickening of the R UT.  No thickening of the mid/lower trap or the rhomboid.  Mild hypomobility of the thoracic spine including PA and rotation mobility.     See Exercise, Manual, and Modality Logs for complete treatment.     Patient Education:    Assessment/Plan  Traction held today due to the amount of reported soreness.   Poornima was able to tolerate the ex wo significantly greatly objective findings.     Plan:  Assess tape for rhomboid/consider trap tape.              Timed:         Manual Therapy:    15     mins  09959;     Therapeutic Exercise:    17     mins  68393;     Neuromuscular Ivan:        mins  88467;    Therapeutic Activity:     10     mins  81752;     Gait Training:           mins  02276;     Ultrasound:          mins  00148;    Ionto                                   mins   09771  Self Care                            mins   56151  Canalith Repos                   mins  69493    Un-Timed:  Electrical Stimulation:    15     mins  59113 ( );  Dry Needling          mins self-pay  Traction          mins 09872  Low Eval          Mins  18125  Mod Eval          Mins  50369  High Eval                            Mins  77228  Re-Eval                               mins  16273    Timed Treatment:   42   mins   Total Treatment:     57   mins    Priya Olivares PT    Physical Therapist

## 2021-10-27 ENCOUNTER — TREATMENT (OUTPATIENT)
Dept: PHYSICAL THERAPY | Facility: CLINIC | Age: 36
End: 2021-10-27

## 2021-10-27 DIAGNOSIS — M54.50 THORACOLUMBAR BACK PAIN: Primary | ICD-10-CM

## 2021-10-27 DIAGNOSIS — M54.6 THORACOLUMBAR BACK PAIN: Primary | ICD-10-CM

## 2021-10-27 DIAGNOSIS — S46.811D TRAPEZIUS STRAIN, RIGHT, SUBSEQUENT ENCOUNTER: ICD-10-CM

## 2021-10-27 PROCEDURE — 97140 MANUAL THERAPY 1/> REGIONS: CPT | Performed by: PHYSICAL THERAPIST

## 2021-10-27 PROCEDURE — 97110 THERAPEUTIC EXERCISES: CPT | Performed by: PHYSICAL THERAPIST

## 2021-10-27 PROCEDURE — 97530 THERAPEUTIC ACTIVITIES: CPT | Performed by: PHYSICAL THERAPIST

## 2021-10-27 PROCEDURE — 97014 ELECTRIC STIMULATION THERAPY: CPT | Performed by: PHYSICAL THERAPIST

## 2021-10-27 NOTE — PROGRESS NOTES
Physical Therapy Daily Progress Note    VISIT#: 17    Subjective   Poornima Cano reports there hasn't been any change    Toothache.   Pain levels 3-6/10.  Most of the time the pain is at the 3 wendie.  She reports that the worse position/activity is the inability to hold an object with the R arm extended.   During her normal job tasks she would  10# from various heights and move from center to the right (that is the job task is would do the most).        Objective   Palpation with mild R UT TP.  No soft tissue thickening of cervical or thoracic paraspinals.    Neutral cervical alignment.  Good costovetebral motions with inhalation and exhalation.  See Exercise, Manual, and Modality Logs for complete treatment.     Patient Education:    Assessment/Plan  Poornima reports the pain has been 3-6/10.  She was able to complete upper body strengthening wo increased objective findings.     ST visits     1)  Reduce pain level by 2-3 points. MET was 5-10 now 3/10 10/6/21     2)  Increase AROM R shldr to =/> 80% norm values wo greater pain. MET. 95% with small arc of pain but can work thru. 10/6/21     3)  Sleep with < 25% interruption due to shldr pain. MET. Able to sleep thru the night now. 10/6/21     4)  Pnt to report less arm pain in the 1st am by 30%. MET. 10/6/21    LTG:   DC (data as of 10/6/21)     1)  0-2/10 pain level in order to complete household and work tasks wo greater pain. Partially met     2)  MMT =/> 4+/5 with improved ability to lift objects for household, yard and daily living tasks wo greater pain.Partially met. 4- lenin weak UT and posterior sh/mid trap      3)  Improve the functional survey =/> 8 points.  Not met 10/6/21  31 to just 29.5%     4)  Normal sleep pattern in relationship to the shldr injury. MET     5)  Resume prior normal work duties.      6)  IHEP. Ongoing as she progresses to more vigorous activity  Plan:  1 more visit in the auth. MD note            Timed:         Manual Therapy:     8     mins  90963;     Therapeutic Exercise:    16     mins  36290;     Neuromuscular Ivan:        mins  69619;    Therapeutic Activity:     12     mins  21154;     Gait Training:           mins  20541;     Ultrasound:          mins  13544;    Ionto                                   mins   45798  Self Care                            mins   57627  Canalith Repos                   mins  67887    Un-Timed:  Electrical Stimulation:    15     mins  65847 ( );  Dry Needling          mins self-pay  Traction          mins 89854  Low Eval          Mins  99793  Mod Eval          Mins  95597  High Eval                            Mins  63596  Re-Eval                               mins  76291    Timed Treatment:   36   mins   Total Treatment:     51   mins    Priya Olivares, PT    Physical Therapist

## 2021-10-28 ENCOUNTER — TREATMENT (OUTPATIENT)
Dept: PHYSICAL THERAPY | Facility: CLINIC | Age: 36
End: 2021-10-28

## 2021-10-28 DIAGNOSIS — S46.811D TRAPEZIUS STRAIN, RIGHT, SUBSEQUENT ENCOUNTER: ICD-10-CM

## 2021-10-28 DIAGNOSIS — M54.6 THORACOLUMBAR BACK PAIN: Primary | ICD-10-CM

## 2021-10-28 DIAGNOSIS — M54.50 THORACOLUMBAR BACK PAIN: Primary | ICD-10-CM

## 2021-10-28 PROCEDURE — 97110 THERAPEUTIC EXERCISES: CPT | Performed by: PHYSICAL THERAPIST

## 2021-10-28 PROCEDURE — 97530 THERAPEUTIC ACTIVITIES: CPT | Performed by: PHYSICAL THERAPIST

## 2021-10-28 PROCEDURE — 97140 MANUAL THERAPY 1/> REGIONS: CPT | Performed by: PHYSICAL THERAPIST

## 2021-10-28 NOTE — PROGRESS NOTES
"Physical Therapy Daily Progress Note    VISIT#: 18    Subjective   Poornima Cano reports that she is the same as yesterday.  Over the last week pain levels from 2-5/10; with 2/10 most of the time.   Slept good last night.   During the ex when she reported pain, Poornima reported \"the same as yesterday\".  This specific ex was R shldr H abd.  Sleep has been affected since the weekend she worked overtime and slipped at work.         Objective   R shldr flex 176,  abd 177,  BTH ER T3,  BTB IR  T9  Cervical:  Flex 52 stretch along c spine,   Ext 65 slight pain into c spine,   BSB 50 with mild contralateral stretch.   L rotation 65 and R rotation 70 wo pain.   MMT:  R shldr 4+ flex/abd/ext/ER/IR.   Mild pain with empty can and lift off wo substitutuion.     Palpation:  Some thickening of the R UT.  Normal soft tissue presentation of rhomboid, lats, mid and lower traps.  Cervical paraspinals with very slight thickening of R levator.    See Exercise, Manual, and Modality Logs for complete treatment.     Patient Education:  Patient to consider purchasing a theracane for usage at home for the UT thickening.     Assessment/Plan  Poornima has been to PT x's a total of 18 sessions, including the eval date.  As of today, active cervical and R shldr motion is normal values.  MMT is > 4+/5 with mild pain during empty can (suprspinatus) and lift off ( subscapularis).  She is I in a home ex program.  No further PT is indicated at this time, as Poornima is able to complete =/> 20# of resistance for shldr strengthening (except H abd)    ST visits     1)  Reduce pain level by 2-3 points. MET was 5-10 now 3/10 10/6/21     2)  Increase AROM R shldr to =/> 80% norm values wo greater pain. MET. 95% with small arc of pain but can work thru. 10/6/21     3)  Sleep with < 25% interruption due to shldr pain. MET. Able to sleep thru the night now. 10/6/21     4)  Pnt to report less arm pain in the 1st am by 30%. MET. 10/6/21    LTG:   DC (data as " of 10/6/21)     1)  0-2/10 pain level in order to complete household and work tasks wo greater pain. Partially met     2)  MMT =/> 4+/5 with improved ability to lift objects for household, yard and daily living tasks wo greater pain.Partially met. 4- lenin weak UT and posterior sh/mid trap      3)  Improve the functional survey =/> 8 points.  Not met 10/6/21  31 to just 29.5%     4)  Normal sleep pattern in relationship to the shldr injury. MET     5)  Resume prior normal work duties.      6)  IHEP. Ongoing as she progresses to more vigorous activity    Plan:   As per [facility]; otherwise DC PT             Timed:         Manual Therapy:    9     mins  16211;     Therapeutic Exercise:    17     mins  56620;     Neuromuscular Ivan:        mins  95687;    Therapeutic Activity:     13     mins  53685;     Gait Training:           mins  91771;     Ultrasound:          mins  35320;    Ionto                                   mins   70154  Self Care                            mins   91069  Canalith Repos                  mins  49459    Un-Timed:  Electrical Stimulation:         mins  37844 ( );  Dry Needling          mins self-pay  Traction          mins 09180  Low Eval          Mins  24664  Mod Eval          Mins  93845  High Eval                            Mins  95829  Re-Eval                               mins  18879    Timed Treatment:   39   mins   Total Treatment:     47   mins    Priya Olivares, PT    Physical Therapist

## 2021-11-03 ENCOUNTER — TRANSCRIBE ORDERS (OUTPATIENT)
Dept: ADMINISTRATIVE | Facility: HOSPITAL | Age: 36
End: 2021-11-03

## 2021-11-03 DIAGNOSIS — S46.811A STRAIN OF RIGHT TRAPEZIUS MUSCLE, INITIAL ENCOUNTER: Primary | ICD-10-CM

## 2021-11-19 ENCOUNTER — HOSPITAL ENCOUNTER (OUTPATIENT)
Dept: MRI IMAGING | Facility: HOSPITAL | Age: 36
Discharge: HOME | End: 2021-11-19
Admitting: PHYSICIAN ASSISTANT

## 2021-11-19 DIAGNOSIS — S46.811A STRAIN OF RIGHT TRAPEZIUS MUSCLE, INITIAL ENCOUNTER: ICD-10-CM

## 2021-11-19 PROCEDURE — 72141 MRI NECK SPINE W/O DYE: CPT

## 2024-08-05 ENCOUNTER — APPOINTMENT (OUTPATIENT)
Dept: GENERAL RADIOLOGY | Facility: HOSPITAL | Age: 39
End: 2024-08-05
Payer: COMMERCIAL

## 2024-08-05 ENCOUNTER — HOSPITAL ENCOUNTER (EMERGENCY)
Facility: HOSPITAL | Age: 39
Discharge: HOME | End: 2024-08-05
Admitting: EMERGENCY MEDICINE
Payer: COMMERCIAL

## 2024-08-05 VITALS
OXYGEN SATURATION: 98 % | DIASTOLIC BLOOD PRESSURE: 77 MMHG | HEIGHT: 66 IN | BODY MASS INDEX: 25.76 KG/M2 | TEMPERATURE: 99.6 F | SYSTOLIC BLOOD PRESSURE: 118 MMHG | WEIGHT: 160.27 LBS | RESPIRATION RATE: 16 BRPM | HEART RATE: 77 BPM
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
## 2024-08-05 DIAGNOSIS — M25.562 ACUTE PAIN OF LEFT KNEE: Primary | ICD-10-CM

## 2024-08-05 DIAGNOSIS — S40.212A ABRASION OF LEFT SHOULDER, INITIAL ENCOUNTER: ICD-10-CM

## 2024-08-05 PROCEDURE — 73562 X-RAY EXAM OF KNEE 3: CPT

## 2024-08-05 PROCEDURE — 99283 EMERGENCY DEPT VISIT LOW MDM: CPT

## 2024-08-05 RX ORDER — DICLOFENAC SODIUM 75 MG/1
75 TABLET, DELAYED RELEASE ORAL 2 TIMES DAILY PRN
Qty: 20 TABLET | Refills: 0 | Status: SHIPPED | OUTPATIENT
Start: 2024-08-05

## 2024-08-05 RX ORDER — ACETAMINOPHEN 500 MG
1000 TABLET ORAL ONCE
Status: COMPLETED | OUTPATIENT
Start: 2024-08-05 | End: 2024-08-05

## 2024-08-05 RX ORDER — IBUPROFEN 600 MG/1
600 TABLET ORAL ONCE
Status: COMPLETED | OUTPATIENT
Start: 2024-08-05 | End: 2024-08-05

## 2024-08-05 RX ADMIN — IBUPROFEN 600 MG: 600 TABLET, FILM COATED ORAL at 03:30

## 2024-08-05 RX ADMIN — ACETAMINOPHEN 1000 MG: 500 TABLET, FILM COATED ORAL at 03:30
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
## 2024-08-05 NOTE — DISCHARGE INSTRUCTIONS
Keep the abrasion clean dry and covered with bacitracin    Use the knee immobilizer and crutches until pain-free if still painful in 10 days follow-up with orthopedics listed above for further evaluation and treatment possible MRI.    Use Voltaren for discomfort do not mix with Motrin ibuprofen Advil or Aleve use Tylenol with this medication for better pain relief.    Return if worse

## 2024-08-05 NOTE — Clinical Note
[facility] EMERGENCY DEPARTMENT  [address] [facility] [place] [address]  Phone: [phone]    Poornima Cano was seen and treated in our emergency department on 8/5/2024.  She may return to work on 08/07/2024.         Thank you for choosing [facility].    Dianne Pérez RN